# Patient Record
Sex: FEMALE | Race: BLACK OR AFRICAN AMERICAN | Employment: UNEMPLOYED | URBAN - METROPOLITAN AREA
[De-identification: names, ages, dates, MRNs, and addresses within clinical notes are randomized per-mention and may not be internally consistent; named-entity substitution may affect disease eponyms.]

---

## 2017-01-22 ENCOUNTER — HOSPITAL ENCOUNTER (OUTPATIENT)
Age: 27
Setting detail: OBSERVATION
Discharge: HOME | End: 2017-01-23
Attending: EMERGENCY MEDICINE | Admitting: OBSTETRICS & GYNECOLOGY
Payer: SELF-PAY

## 2017-01-22 ENCOUNTER — APPOINTMENT (OUTPATIENT)
Dept: ULTRASOUND IMAGING | Age: 27
End: 2017-01-22
Attending: EMERGENCY MEDICINE
Payer: SELF-PAY

## 2017-01-22 ENCOUNTER — ANESTHESIA (OUTPATIENT)
Dept: ULTRASOUND IMAGING | Age: 27
End: 2017-01-22
Payer: SELF-PAY

## 2017-01-22 DIAGNOSIS — O46.90 VAGINAL BLEEDING DURING PREGNANCY, ANTEPARTUM: Primary | ICD-10-CM

## 2017-01-22 DIAGNOSIS — O03.4 INCOMPLETE MISCARRIAGE: ICD-10-CM

## 2017-01-22 LAB
ANION GAP BLD CALC-SCNC: 11 MMOL/L (ref 3–18)
BASOPHILS # BLD AUTO: 0.1 K/UL (ref 0–0.06)
BASOPHILS # BLD: 1 % (ref 0–2)
BUN SERPL-MCNC: 8 MG/DL (ref 7–18)
BUN/CREAT SERPL: 11 (ref 12–20)
CALCIUM SERPL-MCNC: 9 MG/DL (ref 8.5–10.1)
CHLORIDE SERPL-SCNC: 101 MMOL/L (ref 100–108)
CO2 SERPL-SCNC: 26 MMOL/L (ref 21–32)
CREAT SERPL-MCNC: 0.71 MG/DL (ref 0.6–1.3)
DIFFERENTIAL METHOD BLD: ABNORMAL
EOSINOPHIL # BLD: 0.4 K/UL (ref 0–0.4)
EOSINOPHIL NFR BLD: 4 % (ref 0–5)
ERYTHROCYTE [DISTWIDTH] IN BLOOD BY AUTOMATED COUNT: 13 % (ref 11.6–14.5)
GLUCOSE SERPL-MCNC: 94 MG/DL (ref 74–99)
HCG SERPL-ACNC: 7196 MIU/ML (ref 1–6)
HCT VFR BLD AUTO: 34.4 % (ref 35–45)
HGB BLD-MCNC: 11.5 G/DL (ref 12–16)
LYMPHOCYTES # BLD AUTO: 46 % (ref 21–52)
LYMPHOCYTES # BLD: 4.2 K/UL (ref 0.9–3.6)
MCH RBC QN AUTO: 28.8 PG (ref 24–34)
MCHC RBC AUTO-ENTMCNC: 33.4 G/DL (ref 31–37)
MCV RBC AUTO: 86.2 FL (ref 74–97)
MONOCYTES # BLD: 0.5 K/UL (ref 0.05–1.2)
MONOCYTES NFR BLD AUTO: 6 % (ref 3–10)
NEUTS SEG # BLD: 3.9 K/UL (ref 1.8–8)
NEUTS SEG NFR BLD AUTO: 43 % (ref 40–73)
PLATELET # BLD AUTO: 270 K/UL (ref 135–420)
PMV BLD AUTO: 9.7 FL (ref 9.2–11.8)
POTASSIUM SERPL-SCNC: 3.7 MMOL/L (ref 3.5–5.5)
RBC # BLD AUTO: 3.99 M/UL (ref 4.2–5.3)
SODIUM SERPL-SCNC: 138 MMOL/L (ref 136–145)
WBC # BLD AUTO: 9.1 K/UL (ref 4.6–13.2)

## 2017-01-22 PROCEDURE — 86900 BLOOD TYPING SEROLOGIC ABO: CPT | Performed by: EMERGENCY MEDICINE

## 2017-01-22 PROCEDURE — 87491 CHLMYD TRACH DNA AMP PROBE: CPT | Performed by: EMERGENCY MEDICINE

## 2017-01-22 PROCEDURE — 85025 COMPLETE CBC W/AUTO DIFF WBC: CPT | Performed by: EMERGENCY MEDICINE

## 2017-01-22 PROCEDURE — 87210 SMEAR WET MOUNT SALINE/INK: CPT | Performed by: EMERGENCY MEDICINE

## 2017-01-22 PROCEDURE — 76817 TRANSVAGINAL US OBSTETRIC: CPT

## 2017-01-22 PROCEDURE — 74011250636 HC RX REV CODE- 250/636: Performed by: EMERGENCY MEDICINE

## 2017-01-22 PROCEDURE — 86920 COMPATIBILITY TEST SPIN: CPT | Performed by: EMERGENCY MEDICINE

## 2017-01-22 PROCEDURE — 84702 CHORIONIC GONADOTROPIN TEST: CPT | Performed by: EMERGENCY MEDICINE

## 2017-01-22 PROCEDURE — 80048 BASIC METABOLIC PNL TOTAL CA: CPT | Performed by: EMERGENCY MEDICINE

## 2017-01-22 RX ORDER — SODIUM CHLORIDE 9 MG/ML
1000 INJECTION, SOLUTION INTRAVENOUS ONCE
Status: COMPLETED | OUTPATIENT
Start: 2017-01-22 | End: 2017-01-23

## 2017-01-22 RX ADMIN — SODIUM CHLORIDE 1000 ML: 900 INJECTION, SOLUTION INTRAVENOUS at 23:03

## 2017-01-22 NOTE — IP AVS SNAPSHOT
[address] Patient: Armaan Love MRN: [id number] WYN:[birth year] You are allergic to the following No active allergies Recent Documentation Height Weight BMI OB Status Smoking Status 1.524 m 65.3 kg 28.12 kg/m2 Pregnant Never Smoker Unresulted Labs Order Current Status CHLAMYDIA/NEISSERIA AMPLIFICATION In process TYPE & CROSSMATCH Preliminary result Emergency Contacts Name Discharge Info Relation Home Work Mobile Elvia Silva  Other Relative [6] [phone] About your hospitalization You were admitted on:  January 23, 2017 You last received care in the:  [facility] [address] You were discharged on:  January 23, 2017 Unit phone number:  [phone] Why you were hospitalized Your primary diagnosis was:  Not on File Providers Seen During Your Hospitalizations Provider Role Specialty Primary office phone Danita Munguia MD Attending Provider Emergency Medicine [phone] Criss Mancia MD Attending Provider Obstetrics & Gynecology [phone] Your Primary Care Physician (PCP) Primary Care Physician Office Phone Office Fax NONE ** None ** ** None ** Follow-up Information Follow up With Details Comments Contact Info Criss Mancia MD In 2 weeks  [facility] [address] [place] [address] 
[phone] None   None (395) Patient stated that they have no PCP Current Discharge Medication List  
  
START taking these medications Dose & Instructions Dispensing Information Comments Morning Noon Evening Bedtime  
 iron-vitamin C 65 mg iron- 125 mg Tbec Commonly known as:  VITRON-C Your next dose is: Today, Tomorrow Other:  _________ Dose:  1 Tab Take 1 Tab by mouth daily. Quantity:  90 Tab Refills:  1 metroNIDAZOLE 500 mg tablet Commonly known as:  FLAGYL Your next dose is: Today, Tomorrow Other:  _________ Dose:  500 mg Take 1 Tab by mouth two (2) times a day for 5 days. Quantity:  9 Tab Refills:  0  
     
   
   
   
  
 oxyCODONE-acetaminophen 5-325 mg per tablet Commonly known as:  PERCOCET Your next dose is: Today, Tomorrow Other:  _________ Dose:  1-2 Tab Take 1-2 Tabs by mouth every six (6) hours as needed for Pain. Max Daily Amount: 8 Tabs. Quantity:  20 Tab Refills:  0 CONTINUE these medications which have NOT CHANGED Dose & Instructions Dispensing Information Comments Morning Noon Evening Bedtime  
 levothyroxine 125 mcg tablet Commonly known as:  SYNTHROID Your next dose is: Today, Tomorrow Other:  _________ Take  by mouth Daily (before breakfast). Refills:  0  
     
   
   
   
  
 lidocaine 2 % solution Commonly known as:  LIDOCAINE VISCOUS Your next dose is: Today, Tomorrow Other:  _________ Put 10 mL in mouth and swish and spit out QAC and at bedtime Quantity:  200 mL Refills:  0 STOP taking these medications NORCO 5-325 mg per tablet Generic drug:  HYDROcodone-acetaminophen Where to Get Your Medications These medications were sent to [address], [address] [place] [address] [place] [address] Phone:  [phone]  
  iron-vitamin C 65 mg iron- 125 mg Tbec  
 metroNIDAZOLE 500 mg tablet Information on where to get these meds will be given to you by the nurse or doctor. ! Ask your nurse or doctor about these medications  
  oxyCODONE-acetaminophen 5-325 mg per tablet Discharge Instructions None Discharge Instructions Attachments/References D AND C: POST-OP (ENGLISH) Discharge Orders None Can[employer] Announcement We are excited to announce that we are making your provider's discharge notes available to you in CanDiag. You will see these notes when they are completed and signed by the physician that discharged you from your recent hospital stay. If you have any questions or concerns about any information you see in CanDiag, please call the Health Information Department where you were seen or reach out to your Primary Care Provider for more information about your plan of care. Introducing [facility]! Andie Tee introduces CanDiag patient portal. Now you can access parts of your medical record, email your doctor's office, and request medication refills online. 1. In your internet browser, go to [email or web address] 2. Click on the First Time User? Click Here link in the Sign In box. You will see the New Member Sign Up page. 3. Enter your CanDiag Access Code exactly as it appears below. You will not need to use this code after youve completed the sign-up process. If you do not sign up before the expiration date, you must request a new code. · CanDiag Access Code: [email or web address] Expires: 4/22/2017 10:46 PM 
 
4. Enter the last four digits of your Social Security Number (xxxx) and Date of Birth (mm/dd/yyyy) as indicated and click Submit. You will be taken to the next sign-up page. 5. Create a CanDiag ID. This will be your CanDiag login ID and cannot be changed, so think of one that is secure and easy to remember. 6. Create a CanDiag password. You can change your password at any time. 7. Enter your Password Reset Question and Answer. This can be used at a later time if you forget your password. 8. Enter your e-mail address. You will receive e-mail notification when new information is available in [address] 9. Click Sign Up. You can now view and download portions of your medical record. 10. Click the Download Summary menu link to download a portable copy of your medical information. If you have questions, please visit the Frequently Asked Questions section of the [employer] website. Remember, [employer] is NOT to be used for urgent needs. For medical emergencies, dial 911. Now available from your iPhone and Android! General Information Please provide this summary of care documentation to your next provider. Patient Signature:  ____________________________________________________________ Date:  ____________________________________________________________  
  
Ag Ayoub Provider Signature:  ____________________________________________________________ Date:  ____________________________________________________________ More Information Dilation and Curettage: What to Expect at [facility] Your Recovery Dilation and curettage (D&C) is a procedure to remove tissue from the inside of the uterus. The doctor used a curved tool, called a curette, to gently scrape tissue from your uterus. You are likely to have a backache, or cramps similar to menstrual cramps, and pass small clots of blood from your vagina for the first few days. You may continue to have light vaginal bleeding for several weeks after the procedure. You will probably be able to go back to most of your normal activities in 1 or 2 days. This care sheet gives you a general idea about how long it will take for you to recover. But each person recovers at a different pace. Follow the steps below to get better as quickly as possible. How can you care for yourself at home? Activity · Rest when you feel tired. Getting enough sleep will help you recover. · Avoid strenuous activities, such as bicycle riding, jogging, weight lifting, or aerobic exercise, until your doctor says it is okay. · Most women are able to return to work the day after the procedure. · You may have some light vaginal bleeding. Wear sanitary pads if needed. Do not douche or use tampons for 2 weeks or until your doctor says it is okay. · Ask your doctor when it is okay for you to have sex. · If you could become pregnant, talk about birth control with your doctor. Do not try to become pregnant until your doctor says it is okay. Diet · You can eat your normal diet. If your stomach is upset, try bland, low-fat foods like plain rice, broiled chicken, toast, and yogurt. · Drink plenty of fluids (unless your doctor tells you not to). Medicines · Your doctor will tell you if and when you can restart your medicines. He or she will also give you instructions about taking any new medicines. · If you take blood thinners, such as warfarin (Coumadin), clopidogrel (Plavix), or aspirin, be sure to talk to your doctor. He or she will tell you if and when to start taking those medicines again. Make sure that you understand exactly what your doctor wants you to do. · Be safe with medicines. Take pain medicines exactly as directed. ¨ If the doctor gave you a prescription medicine for pain, take it as prescribed. ¨ If you are not taking a prescription pain medicine, ask your doctor if you can take an over-the-counter medicine. · If you think your pain medicine is making you sick to your stomach: 
¨ Take your medicine after meals (unless your doctor has told you not to). ¨ Ask your doctor for a different pain medicine. · If your doctor prescribed antibiotics, take them as directed. Do not stop taking them just because you feel better. You need to take the full course of antibiotics. Follow-up care is a key part of your treatment and safety. Be sure to make and go to all appointments, and call your doctor if you are having problems. It's also a good idea to know your test results and keep a list of the medicines you take. When should you call for help? Call 911 anytime you think you may need emergency care. For example, call if: 
· You passed out (lost consciousness). · You have severe trouble breathing. · You have chest pain and shortness of breath, or you cough up blood. · You have severe pain in your belly. Call your doctor now or seek immediate medical care if: 
· You have bright red vaginal bleeding that soaks one or more pads each hour for 2 or more hours. · You pass blood clots that are larger than a golf ball. · You have vaginal discharge that smells bad. · You are sick to your stomach or cannot keep fluids down. · You have pain that does not get better after you take pain medicine. · You have pain that is getting worse 2 days after the procedure. · You have a fever over 100°F. 
· Your belly feels tender, or full and hard. Watch closely for changes in your health, and be sure to contact your doctor if: 
· You do not get better as expected. Where can you learn more? Go to http://jeancarlos-meera.info/. Enter [phone] in the search box to learn more about \"Dilation and Curettage: What to Expect at Home. \" Current as of: May 30, 2016 Content Version: 11.1 © 4624-2032 [employer]. Care instructions adapted under license by [employer] (which disclaims liability or warranty for this information). If you have questions about a medical condition or this instruction, always ask your healthcare professional. [place] [address] any warranty or liability for your use of this information.

## 2017-01-22 NOTE — IP AVS SNAPSHOT
Current Discharge Medication List  
  
Take these medications at their scheduled times Dose & Instructions Dispensing Information Comments Morning Noon Evening Bedtime  
 iron-vitamin C 65 mg iron- 125 mg Tbec Commonly known as:  VITRON-C Your next dose is: Today, Tomorrow Other:  ____________ Dose:  1 Tab Take 1 Tab by mouth daily. Quantity:  90 Tab Refills:  1  
     
   
   
   
  
 levothyroxine 125 mcg tablet Commonly known as:  SYNTHROID Your next dose is: Today, Tomorrow Other:  ____________ Take  by mouth Daily (before breakfast). Refills:  0  
     
   
   
   
  
 metroNIDAZOLE 500 mg tablet Commonly known as:  FLAGYL Your next dose is: Today, Tomorrow Other:  ____________ Dose:  500 mg Take 1 Tab by mouth two (2) times a day for 5 days. Quantity:  9 Tab Refills:  0 Take these medications as needed Dose & Instructions Dispensing Information Comments Morning Noon Evening Bedtime  
 oxyCODONE-acetaminophen 5-325 mg per tablet Commonly known as:  PERCOCET Your next dose is: Today, Tomorrow Other:  ____________ Dose:  1-2 Tab Take 1-2 Tabs by mouth every six (6) hours as needed for Pain. Max Daily Amount: 8 Tabs. Quantity:  20 Tab Refills:  0 Take these medications as directed Dose & Instructions Dispensing Information Comments Morning Noon Evening Bedtime  
 lidocaine 2 % solution Commonly known as:  LIDOCAINE VISCOUS Your next dose is: Today, Tomorrow Other:  ____________ Put 10 mL in mouth and swish and spit out QAC and at bedtime Quantity:  200 mL Refills:  0 Where to Get Your Medications These medications were sent to [address], [address] [facility]  [address] [place] [address], [address] [facility] [address]  [address] Phone:  [phone]  
  iron-vitamin C 65 mg iron- 125 mg Tbec  
 metroNIDAZOLE 500 mg tablet Information about where to get these medications is not yet available ! Ask your nurse or doctor about these medications  
  oxyCODONE-acetaminophen 5-325 mg per tablet

## 2017-01-22 NOTE — IP AVS SNAPSHOT
Summary of Care Report The Summary of Care report has been created to help improve care coordination. Users with access to [employer] or [address] (Web-based application) may access additional patient information including the Discharge Summary. If you are not currently a [address] user and need more information, please call the number listed below in the [place] [address] section and ask to be connected with Medical Records. Facility Information Name Address Phone [address] [facility]  [address] [facility] [phone] Patient Information Patient Name Sex  Rivas Fletcher ([id number]) Female 1990 Discharge Information Admitting Provider Service Area Unit Roseann Bo MD / [address] Mother Baby Unit / [phone] Discharge Provider Discharge Date/Time Discharge Disposition Destination (none) 2017 Afternoon (Pending) AHR (none) Patient Language Language ENGLISH [13] You are allergic to the following No active allergies Current Discharge Medication List  
  
START taking these medications Dose & Instructions Dispensing Information Comments  
 iron-vitamin C 65 mg iron- 125 mg Tbec Commonly known as:  VITRON-C Dose:  1 Tab Take 1 Tab by mouth daily. Quantity:  90 Tab Refills:  1  
   
 metroNIDAZOLE 500 mg tablet Commonly known as:  FLAGYL Dose:  500 mg Take 1 Tab by mouth two (2) times a day for 5 days. Quantity:  9 Tab Refills:  0  
   
 oxyCODONE-acetaminophen 5-325 mg per tablet Commonly known as:  PERCOCET Dose:  1-2 Tab Take 1-2 Tabs by mouth every six (6) hours as needed for Pain. Max Daily Amount: 8 Tabs. Quantity:  20 Tab Refills:  0 CONTINUE these medications which have NOT CHANGED Dose & Instructions Dispensing Information Comments  
 levothyroxine 125 mcg tablet Commonly known as:  SYNTHROID Take  by mouth Daily (before breakfast). Refills:  0  
   
 lidocaine 2 % solution Commonly known as:  LIDOCAINE VISCOUS Put 10 mL in mouth and swish and spit out QAC and at bedtime Quantity:  200 mL Refills:  0 STOP taking these medications Comments NORCO 5-325 mg per tablet Generic drug:  HYDROcodone-acetaminophen Surgery Information ID Date/Time Status Primary Surgeon All Procedures Location [id number] 1/22/2017 Unpaiyana SALAZAR [facility] - DO NOT SCHEDULE    
 [id number] 1/23/2017 0456 Lauri Irvin MD DILATATION AND CURETTAGE [facility] MAIN OR Follow-up Information Follow up With Details Comments Contact Info Chavo Irvin MD In 2 weeks  [address] [place] [address] 
[phone] None   None (395) Patient stated that they have no PCP Discharge Instructions None Chart Review Routing History No Routing History on File

## 2017-01-23 ENCOUNTER — ANESTHESIA EVENT (OUTPATIENT)
Dept: SURGERY | Age: 27
End: 2017-01-23
Payer: SELF-PAY

## 2017-01-23 ENCOUNTER — ANESTHESIA EVENT (OUTPATIENT)
Dept: ULTRASOUND IMAGING | Age: 27
End: 2017-01-23
Payer: SELF-PAY

## 2017-01-23 ENCOUNTER — ANESTHESIA (OUTPATIENT)
Dept: SURGERY | Age: 27
End: 2017-01-23
Payer: SELF-PAY

## 2017-01-23 VITALS
BODY MASS INDEX: 28.27 KG/M2 | HEART RATE: 75 BPM | HEIGHT: 60 IN | RESPIRATION RATE: 16 BRPM | SYSTOLIC BLOOD PRESSURE: 104 MMHG | WEIGHT: 144 LBS | TEMPERATURE: 98.5 F | DIASTOLIC BLOOD PRESSURE: 64 MMHG | OXYGEN SATURATION: 99 %

## 2017-01-23 LAB
ABO + RH BLD: NORMAL
BASOPHILS # BLD AUTO: 0 K/UL (ref 0–0.06)
BASOPHILS # BLD: 0 % (ref 0–2)
DIFFERENTIAL METHOD BLD: ABNORMAL
EOSINOPHIL # BLD: 0.3 K/UL (ref 0–0.4)
EOSINOPHIL NFR BLD: 4 % (ref 0–5)
ERYTHROCYTE [DISTWIDTH] IN BLOOD BY AUTOMATED COUNT: 12.8 % (ref 11.6–14.5)
ERYTHROCYTE [DISTWIDTH] IN BLOOD BY AUTOMATED COUNT: 13.7 % (ref 11.6–14.5)
HCT VFR BLD AUTO: 22.4 % (ref 35–45)
HCT VFR BLD AUTO: 26.9 % (ref 35–45)
HGB BLD-MCNC: 7.4 G/DL (ref 12–16)
HGB BLD-MCNC: 9 G/DL (ref 12–16)
LYMPHOCYTES # BLD AUTO: 50 % (ref 21–52)
LYMPHOCYTES # BLD: 3.4 K/UL (ref 0.9–3.6)
MCH RBC QN AUTO: 29.2 PG (ref 24–34)
MCH RBC QN AUTO: 29.4 PG (ref 24–34)
MCHC RBC AUTO-ENTMCNC: 33 G/DL (ref 31–37)
MCHC RBC AUTO-ENTMCNC: 33.5 G/DL (ref 31–37)
MCV RBC AUTO: 87.9 FL (ref 74–97)
MCV RBC AUTO: 88.5 FL (ref 74–97)
MONOCYTES # BLD: 0.3 K/UL (ref 0.05–1.2)
MONOCYTES NFR BLD AUTO: 5 % (ref 3–10)
NEUTS SEG # BLD: 2.8 K/UL (ref 1.8–8)
NEUTS SEG NFR BLD AUTO: 41 % (ref 40–73)
PLATELET # BLD AUTO: 149 K/UL (ref 135–420)
PLATELET # BLD AUTO: 199 K/UL (ref 135–420)
PLATELET COMMENTS,PCOM: ABNORMAL
PMV BLD AUTO: 9.2 FL (ref 9.2–11.8)
PMV BLD AUTO: 9.5 FL (ref 9.2–11.8)
RBC # BLD AUTO: 2.53 M/UL (ref 4.2–5.3)
RBC # BLD AUTO: 3.06 M/UL (ref 4.2–5.3)
RBC MORPH BLD: ABNORMAL
SERVICE CMNT-IMP: NORMAL
WBC # BLD AUTO: 6.8 K/UL (ref 4.6–13.2)
WBC # BLD AUTO: 9.4 K/UL (ref 4.6–13.2)
WET PREP GENITAL: NORMAL

## 2017-01-23 PROCEDURE — 99218 HC RM OBSERVATION: CPT

## 2017-01-23 PROCEDURE — 76210000006 HC OR PH I REC 0.5 TO 1 HR: Performed by: OBSTETRICS & GYNECOLOGY

## 2017-01-23 PROCEDURE — 74011250637 HC RX REV CODE- 250/637: Performed by: EMERGENCY MEDICINE

## 2017-01-23 PROCEDURE — 99285 EMERGENCY DEPT VISIT HI MDM: CPT

## 2017-01-23 PROCEDURE — 36415 COLL VENOUS BLD VENIPUNCTURE: CPT | Performed by: OBSTETRICS & GYNECOLOGY

## 2017-01-23 PROCEDURE — 74011250636 HC RX REV CODE- 250/636

## 2017-01-23 PROCEDURE — 74011000250 HC RX REV CODE- 250

## 2017-01-23 PROCEDURE — 96361 HYDRATE IV INFUSION ADD-ON: CPT

## 2017-01-23 PROCEDURE — 74011250636 HC RX REV CODE- 250/636: Performed by: EMERGENCY MEDICINE

## 2017-01-23 PROCEDURE — 85025 COMPLETE CBC W/AUTO DIFF WBC: CPT | Performed by: EMERGENCY MEDICINE

## 2017-01-23 PROCEDURE — 74011000250 HC RX REV CODE- 250: Performed by: NURSE ANESTHETIST, CERTIFIED REGISTERED

## 2017-01-23 PROCEDURE — 77030020782 HC GWN BAIR PAWS FLX 3M -B: Performed by: OBSTETRICS & GYNECOLOGY

## 2017-01-23 PROCEDURE — 74011000250 HC RX REV CODE- 250: Performed by: OBSTETRICS & GYNECOLOGY

## 2017-01-23 PROCEDURE — 96360 HYDRATION IV INFUSION INIT: CPT

## 2017-01-23 PROCEDURE — 88305 TISSUE EXAM BY PATHOLOGIST: CPT | Performed by: OBSTETRICS & GYNECOLOGY

## 2017-01-23 PROCEDURE — 85027 COMPLETE CBC AUTOMATED: CPT | Performed by: OBSTETRICS & GYNECOLOGY

## 2017-01-23 PROCEDURE — 76060000032 HC ANESTHESIA 0.5 TO 1 HR: Performed by: OBSTETRICS & GYNECOLOGY

## 2017-01-23 PROCEDURE — 77030009368: Performed by: OBSTETRICS & GYNECOLOGY

## 2017-01-23 PROCEDURE — 77030027138 HC INCENT SPIROMETER -A

## 2017-01-23 PROCEDURE — P9016 RBC LEUKOCYTES REDUCED: HCPCS | Performed by: EMERGENCY MEDICINE

## 2017-01-23 PROCEDURE — 76010000138 HC OR TIME 0.5 TO 1 HR: Performed by: OBSTETRICS & GYNECOLOGY

## 2017-01-23 RX ORDER — ACETAMINOPHEN 325 MG/1
650 TABLET ORAL
Status: COMPLETED | OUTPATIENT
Start: 2017-01-23 | End: 2017-01-23

## 2017-01-23 RX ORDER — HYDROCODONE BITARTRATE AND ACETAMINOPHEN 5; 325 MG/1; MG/1
2 TABLET ORAL
Status: DISCONTINUED | OUTPATIENT
Start: 2017-01-23 | End: 2017-01-23 | Stop reason: HOSPADM

## 2017-01-23 RX ORDER — NALOXONE HYDROCHLORIDE 0.4 MG/ML
0.4 INJECTION, SOLUTION INTRAMUSCULAR; INTRAVENOUS; SUBCUTANEOUS AS NEEDED
Status: DISCONTINUED | OUTPATIENT
Start: 2017-01-23 | End: 2017-01-23 | Stop reason: HOSPADM

## 2017-01-23 RX ORDER — EPHEDRINE SULFATE/0.9% NACL/PF 25 MG/5 ML
SYRINGE (ML) INTRAVENOUS AS NEEDED
Status: DISCONTINUED | OUTPATIENT
Start: 2017-01-23 | End: 2017-01-23 | Stop reason: HOSPADM

## 2017-01-23 RX ORDER — FENTANYL CITRATE 50 UG/ML
50 INJECTION, SOLUTION INTRAMUSCULAR; INTRAVENOUS
Status: DISCONTINUED | OUTPATIENT
Start: 2017-01-23 | End: 2017-01-23 | Stop reason: HOSPADM

## 2017-01-23 RX ORDER — METRONIDAZOLE 500 MG/1
500 TABLET ORAL 2 TIMES DAILY
Qty: 9 TAB | Refills: 0 | Status: SHIPPED | OUTPATIENT
Start: 2017-01-23 | End: 2017-01-28

## 2017-01-23 RX ORDER — ONDANSETRON 2 MG/ML
4 INJECTION INTRAMUSCULAR; INTRAVENOUS ONCE
Status: DISCONTINUED | OUTPATIENT
Start: 2017-01-23 | End: 2017-01-23 | Stop reason: HOSPADM

## 2017-01-23 RX ORDER — HYDROCODONE BITARTRATE AND ACETAMINOPHEN 5; 325 MG/1; MG/1
1 TABLET ORAL
Status: DISCONTINUED | OUTPATIENT
Start: 2017-01-23 | End: 2017-01-23 | Stop reason: HOSPADM

## 2017-01-23 RX ORDER — OXYCODONE AND ACETAMINOPHEN 5; 325 MG/1; MG/1
1-2 TABLET ORAL
Qty: 20 TAB | Refills: 0 | Status: SHIPPED | OUTPATIENT
Start: 2017-01-23

## 2017-01-23 RX ORDER — SODIUM CHLORIDE, SODIUM LACTATE, POTASSIUM CHLORIDE, CALCIUM CHLORIDE 600; 310; 30; 20 MG/100ML; MG/100ML; MG/100ML; MG/100ML
INJECTION, SOLUTION INTRAVENOUS
Status: DISCONTINUED | OUTPATIENT
Start: 2017-01-23 | End: 2017-01-23 | Stop reason: HOSPADM

## 2017-01-23 RX ORDER — SODIUM CHLORIDE 9 MG/ML
250 INJECTION, SOLUTION INTRAVENOUS AS NEEDED
Status: DISCONTINUED | OUTPATIENT
Start: 2017-01-23 | End: 2017-01-23 | Stop reason: HOSPADM

## 2017-01-23 RX ORDER — ONDANSETRON 2 MG/ML
4 INJECTION INTRAMUSCULAR; INTRAVENOUS
Status: DISCONTINUED | OUTPATIENT
Start: 2017-01-23 | End: 2017-01-23 | Stop reason: HOSPADM

## 2017-01-23 RX ORDER — FENTANYL CITRATE 50 UG/ML
INJECTION, SOLUTION INTRAMUSCULAR; INTRAVENOUS AS NEEDED
Status: DISCONTINUED | OUTPATIENT
Start: 2017-01-23 | End: 2017-01-23 | Stop reason: HOSPADM

## 2017-01-23 RX ORDER — METRONIDAZOLE 500 MG/100ML
500 INJECTION, SOLUTION INTRAVENOUS ONCE
Status: COMPLETED | OUTPATIENT
Start: 2017-01-23 | End: 2017-01-23

## 2017-01-23 RX ORDER — ONDANSETRON 2 MG/ML
INJECTION INTRAMUSCULAR; INTRAVENOUS AS NEEDED
Status: DISCONTINUED | OUTPATIENT
Start: 2017-01-23 | End: 2017-01-23

## 2017-01-23 RX ORDER — SODIUM CHLORIDE 0.9 % (FLUSH) 0.9 %
5-10 SYRINGE (ML) INJECTION AS NEEDED
Status: DISCONTINUED | OUTPATIENT
Start: 2017-01-23 | End: 2017-01-23 | Stop reason: HOSPADM

## 2017-01-23 RX ORDER — KETOROLAC TROMETHAMINE 30 MG/ML
30 INJECTION, SOLUTION INTRAMUSCULAR; INTRAVENOUS
Status: DISCONTINUED | OUTPATIENT
Start: 2017-01-23 | End: 2017-01-23 | Stop reason: HOSPADM

## 2017-01-23 RX ORDER — LIDOCAINE HYDROCHLORIDE 20 MG/ML
INJECTION, SOLUTION EPIDURAL; INFILTRATION; INTRACAUDAL; PERINEURAL AS NEEDED
Status: DISCONTINUED | OUTPATIENT
Start: 2017-01-23 | End: 2017-01-23 | Stop reason: HOSPADM

## 2017-01-23 RX ORDER — SODIUM CHLORIDE 9 MG/ML
1000 INJECTION, SOLUTION INTRAVENOUS ONCE
Status: COMPLETED | OUTPATIENT
Start: 2017-01-23 | End: 2017-01-23

## 2017-01-23 RX ORDER — SODIUM CHLORIDE 9 MG/ML
INJECTION, SOLUTION INTRAVENOUS
Status: DISCONTINUED | OUTPATIENT
Start: 2017-01-23 | End: 2017-01-23 | Stop reason: HOSPADM

## 2017-01-23 RX ORDER — PROPOFOL 10 MG/ML
INJECTION, EMULSION INTRAVENOUS AS NEEDED
Status: DISCONTINUED | OUTPATIENT
Start: 2017-01-23 | End: 2017-01-23 | Stop reason: HOSPADM

## 2017-01-23 RX ORDER — SILVER NITRATE 38.21; 12.74 MG/1; MG/1
STICK TOPICAL AS NEEDED
Status: DISCONTINUED | OUTPATIENT
Start: 2017-01-23 | End: 2017-01-23 | Stop reason: HOSPADM

## 2017-01-23 RX ORDER — FAMOTIDINE 10 MG/ML
20 INJECTION INTRAVENOUS ONCE
Status: DISCONTINUED | OUTPATIENT
Start: 2017-01-23 | End: 2017-01-23 | Stop reason: HOSPADM

## 2017-01-23 RX ORDER — SODIUM CHLORIDE 0.9 % (FLUSH) 0.9 %
5-10 SYRINGE (ML) INJECTION EVERY 8 HOURS
Status: DISCONTINUED | OUTPATIENT
Start: 2017-01-23 | End: 2017-01-23 | Stop reason: HOSPADM

## 2017-01-23 RX ORDER — MIDAZOLAM HYDROCHLORIDE 1 MG/ML
INJECTION, SOLUTION INTRAMUSCULAR; INTRAVENOUS AS NEEDED
Status: DISCONTINUED | OUTPATIENT
Start: 2017-01-23 | End: 2017-01-23 | Stop reason: HOSPADM

## 2017-01-23 RX ORDER — DIPHENHYDRAMINE HCL 25 MG
25 CAPSULE ORAL
Status: DISCONTINUED | OUTPATIENT
Start: 2017-01-23 | End: 2017-01-23 | Stop reason: HOSPADM

## 2017-01-23 RX ADMIN — SODIUM CHLORIDE 1000 ML: 900 INJECTION, SOLUTION INTRAVENOUS at 00:34

## 2017-01-23 RX ADMIN — SODIUM CHLORIDE 1000 ML: 900 INJECTION, SOLUTION INTRAVENOUS at 02:21

## 2017-01-23 RX ADMIN — Medication 5 MG: at 05:09

## 2017-01-23 RX ADMIN — LIDOCAINE HYDROCHLORIDE 60 MG: 20 INJECTION, SOLUTION EPIDURAL; INFILTRATION; INTRACAUDAL; PERINEURAL at 05:08

## 2017-01-23 RX ADMIN — SODIUM CHLORIDE: 9 INJECTION, SOLUTION INTRAVENOUS at 05:10

## 2017-01-23 RX ADMIN — SODIUM CHLORIDE, SODIUM LACTATE, POTASSIUM CHLORIDE, CALCIUM CHLORIDE: 600; 310; 30; 20 INJECTION, SOLUTION INTRAVENOUS at 05:03

## 2017-01-23 RX ADMIN — PROPOFOL 150 MG: 10 INJECTION, EMULSION INTRAVENOUS at 05:09

## 2017-01-23 RX ADMIN — MIDAZOLAM HYDROCHLORIDE 2 MG: 1 INJECTION, SOLUTION INTRAMUSCULAR; INTRAVENOUS at 05:03

## 2017-01-23 RX ADMIN — METRONIDAZOLE 500 MG: 500 SOLUTION INTRAVENOUS at 05:15

## 2017-01-23 RX ADMIN — ONDANSETRON 4 MG: 2 INJECTION INTRAMUSCULAR; INTRAVENOUS at 05:21

## 2017-01-23 RX ADMIN — ACETAMINOPHEN 650 MG: 325 TABLET ORAL at 00:50

## 2017-01-23 RX ADMIN — FENTANYL CITRATE 50 MCG: 50 INJECTION, SOLUTION INTRAMUSCULAR; INTRAVENOUS at 05:08

## 2017-01-23 NOTE — ANESTHESIA PREPROCEDURE EVALUATION
Anesthetic History   No history of anesthetic complications            Review of Systems / Medical History  Patient summary reviewed, nursing notes reviewed and pertinent labs reviewed    Pulmonary  Within defined limits                 Neuro/Psych   Within defined limits           Cardiovascular    Hypertension                   GI/Hepatic/Renal  Within defined limits              Endo/Other      Hypothyroidism       Other Findings   Comments: Current Smoker? NO       Elective Surgery? Yes       Abstained from smoking 24 hours prior to anesthesia? N/A    Risk Factors for Postoperative nausea/vomiting:       History of postoperative nausea/vomiting? NO       Female? YES       Motion sickness? NO       Intended opioid administration for postoperative analgesia?   NO         Physical Exam    Airway  Mallampati: I  TM Distance: 4 - 6 cm  Neck ROM: normal range of motion   Mouth opening: Normal     Cardiovascular    Rhythm: regular  Rate: normal         Dental  No notable dental hx       Pulmonary  Breath sounds clear to auscultation               Abdominal  GI exam deferred       Other Findings            Anesthetic Plan    ASA: 2, emergent  Anesthesia type: general          Induction: Intravenous  Anesthetic plan and risks discussed with: Patient

## 2017-01-23 NOTE — ROUTINE PROCESS
Bedside and Verbal shift change report given to EVELYN Moralez (oncoming nurse) by Misty Rees. Amos Olivo, RN (offgoing nurse). Report included the following information Kardex, OR Summary, Intake/Output, MAR and Recent Results. Assumed care of pt resting in bed room 2208. Awake and alert. VSS. No c/o pain at this time. Admission assessment  Completed. And within normal limits. . [facility]. No s/s of infiltration. Saline lock noted in left arm. Site intact. Clear liquid breakfast taken and tolerated well. No nausea or vomiting    0850. Assisted up to bathroom. Voided 500 mls. of blood tinged UA.,  Visitors present. 1100 oob voided 300 mls blood tinged UA    1145 IV in right AC  Flushed and converted to saline lock. Tolerated a regular diet. No nausea or vomiting. .  2922 Lab here to draw CBC.    1404. Saline lock  In right AC bleeding around insertion site. . Saline lock removed. Dr. Estes notified of CBC results. Discharge orders received. 1545. Discharged home in stable condition after discharge instructions and prescription given. Verbalized understanding.

## 2017-01-23 NOTE — DISCHARGE SUMMARY
Gynecology Surgical Discharge Summary     Name: Ally Abrams MRN: [id number]  SSN: [id number]    YOB: 1990  Age: 32 y.o. Sex: female      Admit date: 1/22/2017    Discharge Date: 1/23/2017      Attending Physician: Chavo Irvin MD     Admission Diagnoses: Acute Blood Loss Anemia and Incomplete Ab    Discharge Diagnoses: Active Problems:  Acute Blood Loss Anemia and Incomplete Ab       Procedures: [facility]     Hospital Course: Normal hospital course for this procedure. Pt noted to have a significant drop in Hgb with ongoing moderate bleeding and was given 2 units of PRBCs. Significant Diagnostic Studies:   Recent Results (from the past 24 hour(s))   CBC WITH AUTOMATED DIFF    Collection Time: 01/22/17 10:50 PM   Result Value Ref Range    WBC 9.1 4.6 - 13.2 K/uL    RBC 3.99 (L) 4.20 - 5.30 M/uL    HGB 11.5 (L) 12.0 - 16.0 g/dL    HCT 34.4 (L) 35.0 - 45.0 %    MCV 86.2 74.0 - 97.0 FL    MCH 28.8 24.0 - 34.0 PG    MCHC 33.4 31.0 - 37.0 g/dL    RDW 13.0 11.6 - 14.5 %    PLATELET 322 723 - 340 K/uL    MPV 9.7 9.2 - 11.8 FL    NEUTROPHILS 43 40 - 73 %    LYMPHOCYTES 46 21 - 52 %    MONOCYTES 6 3 - 10 %    EOSINOPHILS 4 0 - 5 %    BASOPHILS 1 0 - 2 %    ABS. NEUTROPHILS 3.9 1.8 - 8.0 K/UL    ABS. LYMPHOCYTES 4.2 (H) 0.9 - 3.6 K/UL    ABS. MONOCYTES 0.5 0.05 - 1.2 K/UL    ABS. EOSINOPHILS 0.4 0.0 - 0.4 K/UL    ABS.  BASOPHILS 0.1 (H) 0.0 - 0.06 K/UL    DF AUTOMATED     METABOLIC PANEL, BASIC    Collection Time: 01/22/17 10:50 PM   Result Value Ref Range    Sodium 138 136 - 145 mmol/L    Potassium 3.7 3.5 - 5.5 mmol/L    Chloride 101 100 - 108 mmol/L    CO2 26 21 - 32 mmol/L    Anion gap 11 3.0 - 18 mmol/L    Glucose 94 74 - 99 mg/dL    BUN 8 7.0 - 18 MG/DL    Creatinine 0.71 0.6 - 1.3 MG/DL    BUN/Creatinine ratio 11 (L) 12 - 20      GFR est AA >60 >60 ml/min/1.73m2    GFR est non-AA >60 >60 ml/min/1.73m2    Calcium 9.0 8.5 - 10.1 MG/DL   TYPE, ABO & RH    Collection Time: 01/22/17 10:50 PM Result Value Ref Range    ABO/Rh(D) O POSITIVE    TOTAL HCG, QT. Collection Time: 01/22/17 10:50 PM   Result Value Ref Range    HCG, Qt. 7196 (H) 1.0 - 6.0 MIU/ML   TYPE & CROSSMATCH    Collection Time: 01/22/17 10:50 PM   Result Value Ref Range    Crossmatch Expiration 01/25/2017     ABO/Rh(D) O POSITIVE     Antibody screen NEG     Unit number [id number]     Blood component type RC LR CPDA     Unit division 00     Status of unit ISSUED     Crossmatch result Compatible     Unit number [id number]     Blood component type RC LR CPDA     Unit division 00     Status of unit ISSUED     Crossmatch result Compatible     Unit number [id number]     Blood component type RC LR CPDA     Unit division 00     Status of unit ISSUED     Crossmatch result Compatible     Unit number [id number]     Blood component type RC LR AS3,1     Unit division 00     Status of unit ALLOCATED     Crossmatch result Compatible     Unit number [id number]     Blood component type RC LR AS3,2     Unit division 00     Status of unit ALLOCATED     Crossmatch result Compatible    CBC WITH AUTOMATED DIFF    Collection Time: 01/23/17  2:10 AM   Result Value Ref Range    WBC 6.8 4.6 - 13.2 K/uL    RBC 2.53 (L) 4.20 - 5.30 M/uL    HGB 7.4 (L) 12.0 - 16.0 g/dL    HCT 22.4 (L) 35.0 - 45.0 %    MCV 88.5 74.0 - 97.0 FL    MCH 29.2 24.0 - 34.0 PG    MCHC 33.0 31.0 - 37.0 g/dL    RDW 12.8 11.6 - 14.5 %    PLATELET 675 036 - 621 K/uL    MPV 9.2 9.2 - 11.8 FL    NEUTROPHILS 41 40 - 73 %    LYMPHOCYTES 50 21 - 52 %    MONOCYTES 5 3 - 10 %    EOSINOPHILS 4 0 - 5 %    BASOPHILS 0 0 - 2 %    ABS. NEUTROPHILS 2.8 1.8 - 8.0 K/UL    ABS. LYMPHOCYTES 3.4 0.9 - 3.6 K/UL    ABS. MONOCYTES 0.3 0.05 - 1.2 K/UL    ABS. EOSINOPHILS 0.3 0.0 - 0.4 K/UL    ABS.  BASOPHILS 0.0 0.0 - 0.06 K/UL    DF AUTOMATED      PLATELET COMMENTS ADEQUATE PLATELETS      RBC COMMENTS NORMOCYTIC, NORMOCHROMIC         Patient Instructions:   Current Discharge Medication List START taking these medications    Details   oxyCODONE-acetaminophen (PERCOCET) 5-325 mg per tablet Take 1-2 Tabs by mouth every six (6) hours as needed for Pain. Max Daily Amount: 8 Tabs. Qty: 20 Tab, Refills: 0   Flagyl 500 mg BID x 5 days      CONTINUE these medications which have NOT CHANGED    Details   levothyroxine (SYNTHROID) 125 mcg tablet Take  by mouth Daily (before breakfast). lidocaine (LIDOCAINE VISCOUS) 2 % solution Put 10 mL in mouth and swish and spit out QAC and at bedtime  Qty: 200 mL, Refills: 0         STOP taking these medications       hydrocodone-acetaminophen (NORCO) 5-325 mg per tablet Comments:   Reason for Stopping:              Activity: No sex, douching, or tampons for 2 weeks or as directed by your physician. No heavy lifting for 2 weeks. No driving while taking pain medication. Diet: Resume pre-hospital diet      Follow-up with German Martinez in 2 weeks.         Signed By:  Carroll Alex MD     January 23, 2017 5:43 AM

## 2017-01-23 NOTE — H&P
Name: Shell Guerra MRN: [id number]    YOB: 1990  Age: 32 y.o. Sex: female        Chief Complaint   Patient presents with    Pregnancy Problem       HPI   1. Incomplete Ab--> Notes that she started having cramping with spotting this am, mild spotting during the day. Associated with cramping, went to eat approx 1830, started having more intense cramping that got worse. Noted moderate amount of bleeding, went to he hospital. [place] like her \"water broke, \" passed fluid and then started bleeding heavily, passed something that looked like a fetus. Bleeding has not let up very much, mildly better than before    OB History      Para Term  AB TAB SAB Ectopic Multiple Living    3 1 1  1 1    1        Obstetric Comments    LMP 10/28/2016 Patient is pregnant. Periods regular, last 5 days, flow heavy to light, mild dysmenorrhea  History of sexually transmitted infections chlamydia             PGyn  History   Sexual Activity    Sexual activity: Yes    Partners: Male     Comment: planned pregnancy         Past Medical History   Diagnosis Date    Hypothyroid        Past Surgical History   Procedure Laterality Date    Hx thyroidectomy         No Known Allergies    No current facility-administered medications on file prior to encounter. Current Outpatient Prescriptions on File Prior to Encounter   Medication Sig Dispense Refill    levothyroxine (SYNTHROID) 125 mcg tablet Take  by mouth Daily (before breakfast).  hydrocodone-acetaminophen (NORCO) 5-325 mg per tablet Take 1 tablet by mouth.  lidocaine (LIDOCAINE VISCOUS) 2 % solution Put 10 mL in mouth and swish and spit out QAC and at bedtime 200 mL 0       Social History     Social History    Marital status: SINGLE     Spouse name: N/A    Number of children: N/A    Years of education: N/A     Occupational History    Not on file.      Social History Main Topics    Smoking status: Never Smoker    Smokeless tobacco: Not on file    Alcohol use No    Drug use: No    Sexual activity: Yes     Partners: Male      Comment: planned pregnancy     Other Topics Concern    Not on file     Social History Narrative       Family History   Problem Relation Age of Onset    Thyroid Disease Maternal Grandmother        ROS   SOB and chest pain in the ED, some dizziness, nausea with dizziness, otherwise as per HPI        Visit Vitals    BP 90/46 (BP 1 Location: Right arm, BP Patient Position: At rest)    Pulse 79    Temp 98.1 °F (36.7 °C)    Resp 15    Ht 5' (1.524 m)    Wt 65.3 kg (144 lb)    SpO2 97%    BMI 28.12 kg/m2       GENERAL:  Well developed, well nourished, in no distress  NEURO/PSYCHE: Grossly intact, normal mood and affect  HEENT: Normal cephalic, atraumatic, good dentition, neck supple.  No thyromegaly  CV: regular rate and rhythm  LUNGS: clear to auscultation bilaterally, no wheezes, rhonchi or rales, good air entry with normal effort  ABDOMEN: + BS, soft without tenderness, no guarding, rebound or masses  EXTREMITIES: no edema or erythema noted  SKIN:  Warm, dry, no lesions  LYMPHATICS: No supraclavicular or inguinal nodes noted    PELVIC EXAM:  LABIA MAJORA: no masses, tenderness or lesions  LABIA MINORA: no masses, tenderness or lesions  CLITORIS: no masses, tenderness or lesions  URETHRA: normal appearing, no masses or tenderness  BLADDER: no fullness or tenderness  VAGINA: Introitus fills with BRB with placement of speculum, unable to see anything   PERINEUM: Blood noted        Recent Results (from the past 24 hour(s))   CBC WITH AUTOMATED DIFF    Collection Time: 01/22/17 10:50 PM   Result Value Ref Range    WBC 9.1 4.6 - 13.2 K/uL    RBC 3.99 (L) 4.20 - 5.30 M/uL    HGB 11.5 (L) 12.0 - 16.0 g/dL    HCT 34.4 (L) 35.0 - 45.0 %    MCV 86.2 74.0 - 97.0 FL    MCH 28.8 24.0 - 34.0 PG    MCHC 33.4 31.0 - 37.0 g/dL    RDW 13.0 11.6 - 14.5 %    PLATELET 078 630 - 270 K/uL    MPV 9.7 9.2 - 11.8 FL    NEUTROPHILS 43 40 - 73 % LYMPHOCYTES 46 21 - 52 %    MONOCYTES 6 3 - 10 %    EOSINOPHILS 4 0 - 5 %    BASOPHILS 1 0 - 2 %    ABS. NEUTROPHILS 3.9 1.8 - 8.0 K/UL    ABS. LYMPHOCYTES 4.2 (H) 0.9 - 3.6 K/UL    ABS. MONOCYTES 0.5 0.05 - 1.2 K/UL    ABS. EOSINOPHILS 0.4 0.0 - 0.4 K/UL    ABS. BASOPHILS 0.1 (H) 0.0 - 0.06 K/UL    DF AUTOMATED     METABOLIC PANEL, BASIC    Collection Time: 01/22/17 10:50 PM   Result Value Ref Range    Sodium 138 136 - 145 mmol/L    Potassium 3.7 3.5 - 5.5 mmol/L    Chloride 101 100 - 108 mmol/L    CO2 26 21 - 32 mmol/L    Anion gap 11 3.0 - 18 mmol/L    Glucose 94 74 - 99 mg/dL    BUN 8 7.0 - 18 MG/DL    Creatinine 0.71 0.6 - 1.3 MG/DL    BUN/Creatinine ratio 11 (L) 12 - 20      GFR est AA >60 >60 ml/min/1.73m2    GFR est non-AA >60 >60 ml/min/1.73m2    Calcium 9.0 8.5 - 10.1 MG/DL   TYPE, ABO & RH    Collection Time: 01/22/17 10:50 PM   Result Value Ref Range    ABO/Rh(D) O POSITIVE    TOTAL HCG, QT. Collection Time: 01/22/17 10:50 PM   Result Value Ref Range    HCG, Qt. 7196 (H) 1.0 - 6.0 MIU/ML   CBC WITH AUTOMATED DIFF    Collection Time: 01/23/17  2:10 AM   Result Value Ref Range    WBC 6.8 4.6 - 13.2 K/uL    RBC 2.53 (L) 4.20 - 5.30 M/uL    HGB 7.4 (L) 12.0 - 16.0 g/dL    HCT 22.4 (L) 35.0 - 45.0 %    MCV 88.5 74.0 - 97.0 FL    MCH 29.2 24.0 - 34.0 PG    MCHC 33.0 31.0 - 37.0 g/dL    RDW 12.8 11.6 - 14.5 %    PLATELET 457 549 - 208 K/uL    MPV 9.2 9.2 - 11.8 FL    NEUTROPHILS 41 40 - 73 %    LYMPHOCYTES 50 21 - 52 %    MONOCYTES 5 3 - 10 %    EOSINOPHILS 4 0 - 5 %    BASOPHILS 0 0 - 2 %    ABS. NEUTROPHILS 2.8 1.8 - 8.0 K/UL    ABS. LYMPHOCYTES 3.4 0.9 - 3.6 K/UL    ABS. MONOCYTES 0.3 0.05 - 1.2 K/UL    ABS. EOSINOPHILS 0.3 0.0 - 0.4 K/UL    ABS. BASOPHILS 0.0 0.0 - 0.06 K/UL    DF AUTOMATED      PLATELET COMMENTS ADEQUATE PLATELETS      RBC COMMENTS NORMOCYTIC, NORMOCHROMIC       US  Uterus: The uterus measures 9.4 x 5.1 x 6.7 cm.  The endometrium is thickened at  2 cm with mildly heterogeneous material which is at least partially fluid. No  intrauterine gestational sac is identified.     The ovaries are obscured by bowel gas. No adnexal pathology is identified.     Free Fluid: Not present.     IMPRESSION  IMPRESSION:     There is no intrauterine gestation identified. Differential considerations  considering the elevated beta hCG include ectopic gestation and recent  spontaneous        1. Incomplete AB--> Reviewed that this is not her fault, she did not cause this, could not have prevented this, all of her questions were answered. R/B/A of suction D&C reviewed including but not limited to anesthesia, infection, bleeding, bleeding requiring blood transfusion, injury to internal organs, and perforation. All of her questions were answered.

## 2017-01-23 NOTE — ROUTINE PROCESS
TRANSFER - OUT REPORT:    Verbal report given to [facility] RN(name) on Krista Suazo  being transferred to [address] [place] [address] ED(unit) for ordered procedure  (surgical evaluation for possible D&C by Dr Hazel Mcdowell)    Report consisted of patients Situation, Background, Assessment, current findings/treatments/plan of care  Information from the following report(s) ED Summary was reviewed with the receiving nurse. Lines:   Peripheral IV 01/22/17 Left Antecubital (Active)   Site Assessment Clean, dry, & intact 1/22/2017 10:53 PM   Phlebitis Assessment 0 1/22/2017 10:53 PM   Infiltration Assessment 0 1/22/2017 10:53 PM   Dressing Status Clean, dry, & intact 1/22/2017 10:53 PM   Hub Color/Line Status Green 1/22/2017 10:53 PM       Peripheral IV 01/23/17 Right Antecubital (Active)   Site Assessment Clean, dry, & intact 1/23/2017  2:17 AM   Phlebitis Assessment 0 1/23/2017  2:17 AM   Infiltration Assessment 0 1/23/2017  2:17 AM   Dressing Status Clean, dry, & intact 1/23/2017  2:17 AM   Hub Color/Line Status Pink 1/23/2017  2:17 AM        Opportunity for questions and clarification was provided. Patient transported with:   Iv fluids/cardiac monitor

## 2017-01-23 NOTE — ED NOTES
Pt has had 2 additional episodes of moderate bleeding.   Possibly products of conception collected and sent to lab

## 2017-01-23 NOTE — OP NOTES
SUCTION CURETTAGE FULL OP NOTE          PREOPERATIVE DIAGNOSIS:  Incomplete Ab [O02.1]    POSTOPERATIVE DIAGNOSIS:  Incomplete Ab    PROCEDURE: Procedure(s):  SUCTION DILATATION AND CURETTAGE     SURGEON:  Delton Lesch, MD    ANESTHESIA:general    EBL: 75 mL    SPECIMENS: Products of conception    FINDINGS: Normal appearing cervix dilated sufficient to admit a 11 mm curette, POCs, sounded to 11 cm with the curette, moderate uterovaginal prolapse    DESCRIPTION OF PROCEDURE:      After consent was obtained, the patient was placed on the operating table and placed under general anesthesia. She was placed in the dorsal lithotomy position and prepped and draped in the usual fashion. A bivalve vaginal speculum was placed and the cervix was grasped with a single-tooth tenaculum. A curved 11 mm suction curette device was introduced into the endometrial cavity. Thorough suction curettage followed until the suction returned no further clot or products of conception. Gentle sharp curette revealed no more products. Minimal bleeding was noted, instruments were removed. The tenaculum sites were made hemostatic with silver nitrate. The patient went to the recovery room in satisfactory condition. All counts were correct times two. Patient's blood type is Rh +. Will give 2 units of PRBCs, check hgb after second unit, if stable, plan D/C home.       Delton Lesch, MD  1/23/2017  5:34 AM

## 2017-01-23 NOTE — ANESTHESIA PREPROCEDURE EVALUATION
Anesthetic History   No history of anesthetic complications            Review of Systems / Medical History  Patient summary reviewed, nursing notes reviewed and pertinent labs reviewed    Pulmonary  Within defined limits                 Neuro/Psych   Within defined limits           Cardiovascular    Hypertension              Exercise tolerance: >4 METS     GI/Hepatic/Renal  Within defined limits              Endo/Other      Hypothyroidism       Other Findings            Physical Exam    Airway  Mallampati: I  TM Distance: 4 - 6 cm  Neck ROM: normal range of motion   Mouth opening: Normal     Cardiovascular    Rhythm: regular  Rate: normal         Dental  No notable dental hx       Pulmonary  Breath sounds clear to auscultation               Abdominal  GI exam deferred       Other Findings            Anesthetic Plan    ASA: 2, emergent  Anesthesia type: general          Induction: Intravenous  Anesthetic plan and risks discussed with: Patient

## 2017-01-23 NOTE — ED NOTES
Verbal report provided to OR nurse. Pt transported to OR via stretcher. Pt physically stable upon transport.

## 2017-01-23 NOTE — PERIOP NOTES
TRANSFER - OUT REPORT:    Verbal report given to [address] [place] [address] on Nilam Huang  being transferred to [facility] for routine post - op       Report consisted of patients Situation, Background, Assessment and   Recommendations(SBAR). Information from the following report(s) SBAR, OR Summary, Procedure Summary, Intake/Output, MAR and Recent Results was reviewed with the receiving nurse. Lines:   Peripheral IV 01/22/17 Left Antecubital (Active)   Site Assessment Clean, dry, & intact 1/23/2017  6:18 AM   Phlebitis Assessment 0 1/23/2017  6:18 AM   Infiltration Assessment 0 1/23/2017  6:18 AM   Dressing Status Clean, dry, & intact 1/23/2017  6:18 AM   Dressing Type Transparent 1/23/2017  6:18 AM   Hub Color/Line Status Green; Infusing;Patent 1/23/2017  5:41 AM       Peripheral IV 01/23/17 Right Antecubital (Active)   Site Assessment Clean, dry, & intact 1/23/2017  6:18 AM   Phlebitis Assessment 0 1/23/2017  6:18 AM   Infiltration Assessment 0 1/23/2017  6:18 AM   Dressing Status Clean, dry, & intact 1/23/2017  6:18 AM   Dressing Type Transparent 1/23/2017  6:18 AM   Hub Color/Line Status Pink; Infusing;Patent 1/23/2017  6:18 AM        Opportunity for questions and clarification was provided.       Patient transported with:   Registered Nurse

## 2017-01-23 NOTE — ANESTHESIA POSTPROCEDURE EVALUATION
Post-Anesthesia Evaluation & Assessment    Visit Vitals    /63    Pulse 68    Temp 36.7 °C (98 °F)    Resp 15    Ht 5' (1.524 m)    Wt 65.3 kg (144 lb)    SpO2 100%    BMI 28.12 kg/m2       Post-operative hydration adequate. Pain score (VAS): 0 Pain Scale 1: Visual (01/23/17 0541)  Pain Intensity 1: 0 (01/23/17 0541)   Managed. Mental status & Level of consciousness: alert and oriented x 3    Neurological status: moves all extremities, sensation grossly intact    Pulmonary status: airway patent, no supplemental oxygen required    Complications related to anesthesia: none    Patient has met all discharge requirements.     Additional comments:        Wm Hyatt MD  January 23, 2017

## 2017-01-23 NOTE — ED TRIAGE NOTES
Pt c.o abdominal cramping x1-2 weeks, worse today. States she began spotting earlier today. Reports her \"water broke\" upon arrival to ER. Vaginal bleeding noted upon initial assessment. Pt states she is 12 weeks pregnant.  . LMP 10/28  Pt denies seeing OBGYN during this pregnancy.

## 2017-01-23 NOTE — ED NOTES
Upon arrival to the ED patient is alert and oriented x4. Heavy bleeding through the brief and chux pads. Patients undergarments changed and well and blankets and lines. Patient temperature 98.1. Patients states that she has been dizzy during her route with the medics, until now. Patient also states that she is cramping. A bolus of fluid has been started running through her existing IV started at [facility].

## 2017-01-23 NOTE — ED PROVIDER NOTES
HPI Comments: Pt c/o vag bleeding, x one day, was spotting, heavier after arrival, says h/o , one , curr mp 10/28/16, no gyn eval yet. Mild low abd cramping. No back pain. No cp or sob. No weakness. No other bleeding. Patient is a 32 y.o. female presenting with pregnancy problem. Pregnancy Problem    Pertinent negatives include no fever, no vomiting, no chest pain and no back pain. History reviewed. No pertinent past medical history. Past Surgical History:   Procedure Laterality Date    Hx thyroidectomy      Hx thyroidectomy           History reviewed. No pertinent family history. Social History     Social History    Marital status: SINGLE     Spouse name: N/A    Number of children: N/A    Years of education: N/A     Occupational History    Not on file. Social History Main Topics    Smoking status: Never Smoker    Smokeless tobacco: Not on file    Alcohol use No    Drug use: No    Sexual activity: Not on file     Other Topics Concern    Not on file     Social History Narrative         ALLERGIES: Review of patient's allergies indicates no known allergies. Review of Systems   Constitutional: Negative for fever. HENT: Negative for congestion. Respiratory: Negative for cough and shortness of breath. Cardiovascular: Negative for chest pain. Gastrointestinal: Negative for abdominal pain and vomiting. Genitourinary: Positive for vaginal bleeding. Musculoskeletal: Negative for back pain. Skin: Negative for rash. Neurological: Negative for light-headedness. All other systems reviewed and are negative. Vitals:    17 0115 17 0130 17 0201 17 0203   BP: 113/69 108/60 (!) 83/57 97/47   Pulse: 80 76 99 85   Resp: 13 11 22 18   Temp:       SpO2: 99% 99% 99% 100%   Weight:       Height:                Physical Exam   Constitutional: She is oriented to person, place, and time. She appears well-developed.    HENT:   Head: Normocephalic. Mouth/Throat: Oropharynx is clear and moist.   Eyes: Pupils are equal, round, and reactive to light. Neck: Normal range of motion. Cardiovascular: Normal rate and normal heart sounds. No murmur heard. Pulmonary/Chest: Effort normal. She has no wheezes. She has no rales. Abdominal: Soft. There is tenderness (+ cramping). Genitourinary:   Genitourinary Comments: Chaperoned by Theotis Rushing  lg amt of blood in vaginal vault. Musculoskeletal: Normal range of motion. She exhibits no edema. Neurological: She is alert and oriented to person, place, and time. Skin: Skin is warm and dry. Nursing note and vitals reviewed.        [facility]  ED Course       Procedures    Vitals:  Patient Vitals for the past 12 hrs:   Temp Pulse Resp BP SpO2   01/23/17 0203 - 85 18 97/47 100 %   01/23/17 0201 - 99 22 (!) 83/57 99 %   01/23/17 0130 - 76 11 108/60 99 %   01/23/17 0115 - 80 13 113/69 99 %   01/23/17 0100 - 66 16 106/60 100 %   01/23/17 0048 - 71 15 120/67 99 %   01/23/17 0033 - 70 16 107/62 99 %   01/22/17 2315 - - - 112/73 100 %   01/22/17 2252 98 °F (36.7 °C) 69 20 118/67 98 %         Medications ordered:   Medications   0.9% sodium chloride infusion 1,000 mL (0 mL IntraVENous IV Completed 1/23/17 0003)   0.9% sodium chloride infusion 1,000 mL (1,000 mL IntraVENous New Bag 1/23/17 0034)   acetaminophen (TYLENOL) tablet 650 mg (650 mg Oral Given 1/23/17 0050)         Lab findings:  Recent Results (from the past 12 hour(s))   CBC WITH AUTOMATED DIFF    Collection Time: 01/22/17 10:50 PM   Result Value Ref Range    WBC 9.1 4.6 - 13.2 K/uL    RBC 3.99 (L) 4.20 - 5.30 M/uL    HGB 11.5 (L) 12.0 - 16.0 g/dL    HCT 34.4 (L) 35.0 - 45.0 %    MCV 86.2 74.0 - 97.0 FL    MCH 28.8 24.0 - 34.0 PG    MCHC 33.4 31.0 - 37.0 g/dL    RDW 13.0 11.6 - 14.5 %    PLATELET 210 051 - 922 K/uL    MPV 9.7 9.2 - 11.8 FL    NEUTROPHILS 43 40 - 73 %    LYMPHOCYTES 46 21 - 52 %    MONOCYTES 6 3 - 10 %    EOSINOPHILS 4 0 - 5 % BASOPHILS 1 0 - 2 %    ABS. NEUTROPHILS 3.9 1.8 - 8.0 K/UL    ABS. LYMPHOCYTES 4.2 (H) 0.9 - 3.6 K/UL    ABS. MONOCYTES 0.5 0.05 - 1.2 K/UL    ABS. EOSINOPHILS 0.4 0.0 - 0.4 K/UL    ABS. BASOPHILS 0.1 (H) 0.0 - 0.06 K/UL    DF AUTOMATED     METABOLIC PANEL, BASIC    Collection Time: 01/22/17 10:50 PM   Result Value Ref Range    Sodium 138 136 - 145 mmol/L    Potassium 3.7 3.5 - 5.5 mmol/L    Chloride 101 100 - 108 mmol/L    CO2 26 21 - 32 mmol/L    Anion gap 11 3.0 - 18 mmol/L    Glucose 94 74 - 99 mg/dL    BUN 8 7.0 - 18 MG/DL    Creatinine 0.71 0.6 - 1.3 MG/DL    BUN/Creatinine ratio 11 (L) 12 - 20      GFR est AA >60 >60 ml/min/1.73m2    GFR est non-AA >60 >60 ml/min/1.73m2    Calcium 9.0 8.5 - 10.1 MG/DL   TYPE, ABO & RH    Collection Time: 01/22/17 10:50 PM   Result Value Ref Range    ABO/Rh(D) O POSITIVE    TOTAL HCG, QT. Collection Time: 01/22/17 10:50 PM   Result Value Ref Range    HCG, Qt. 7196 (H) 1.0 - 6.0 MIU/ML           X-Ray, CT or other radiology findings or impressions:  US UTS TRANSVAGINAL OB   Final Result          Progress notes, Consult notes or additional Procedure notes:   Cont heavy bleeding, bp drop, sbp < 90, resolved w iv hydration  2:14 AM d/w dr Carissa Kahn, to eval pt, prob d and c.  req tx to   2:15 AM d/w dr Jorge Luis Zavala in  ed, to accept    Disposition:  Diagnosis:   1. Vaginal bleeding during pregnancy, antepartum    2. Incomplete miscarriage        Disposition: home    Follow-up Information     None           Patient's Medications   Start Taking    No medications on file   Continue Taking    HYDROCODONE-ACETAMINOPHEN (NORCO) 5-325 MG PER TABLET    Take 1 tablet by mouth. LEVOTHYROXINE (SYNTHROID) 125 MCG TABLET    Take  by mouth Daily (before breakfast).     LIDOCAINE (LIDOCAINE VISCOUS) 2 % SOLUTION    Put 10 mL in mouth and swish and spit out QAC and at bedtime   These Medications have changed    No medications on file   Stop Taking    No medications on file

## 2017-01-23 NOTE — ED NOTES
Pt rang called bell to have RN change pad. While RN in room pt c/o dizziness. Pts BP decreased. Pt in trendelenburg. ER MD called to bedside. 3rd NACL bag infusing.

## 2017-01-23 NOTE — ED NOTES
Pt has had numerous episodes of bleeding with clots noted. Pt denies dizziness, feeling lightheaded. Pt only c/o mild lower abdominal cramping. Pt is on cardiac monitor and cont SP02. Call bell is within reach. Bed rails up x2. Pts  at bedside.

## 2017-01-24 LAB
C TRACH RRNA SPEC QL NAA+PROBE: NEGATIVE
N GONORRHOEA RRNA SPEC QL NAA+PROBE: NEGATIVE
SPECIMEN SOURCE: NORMAL

## 2017-01-26 LAB
ABO + RH BLD: NORMAL
BLD PROD TYP BPU: NORMAL
BLOOD GROUP ANTIBODIES SERPL: NORMAL
BPU ID: NORMAL
CROSSMATCH RESULT,%XM: NORMAL
SPECIMEN EXP DATE BLD: NORMAL
STATUS OF UNIT,%ST: NORMAL
UNIT DIVISION, %UDIV: 0

## 2017-01-27 ENCOUNTER — TELEPHONE (OUTPATIENT)
Dept: OBGYN CLINIC | Age: 27
End: 2017-01-27

## 2017-01-27 NOTE — TELEPHONE ENCOUNTER
----- Message from Chavo Irvin MD sent at 1/25/2017  7:36 PM EST -----  Please have pt schedule a follow up visit. Thanks!

## 2019-10-28 ENCOUNTER — HOSPITAL ENCOUNTER (EMERGENCY)
Age: 29
Discharge: HOME | End: 2019-10-28
Attending: EMERGENCY MEDICINE | Admitting: EMERGENCY MEDICINE
Payer: SELF-PAY

## 2019-10-28 VITALS
HEART RATE: 64 BPM | SYSTOLIC BLOOD PRESSURE: 104 MMHG | OXYGEN SATURATION: 100 % | DIASTOLIC BLOOD PRESSURE: 70 MMHG | BODY MASS INDEX: 25.91 KG/M2 | WEIGHT: 132 LBS | TEMPERATURE: 98.2 F | RESPIRATION RATE: 18 BRPM | HEIGHT: 60 IN

## 2019-10-28 DIAGNOSIS — R42 LIGHTHEADEDNESS: ICD-10-CM

## 2019-10-28 DIAGNOSIS — Z33.1: Primary | ICD-10-CM

## 2019-10-28 LAB
ANION GAP BLD CALC-SCNC: 15 MMOL/L (ref 10–20)
APPEARANCE UR: CLEAR
BILIRUB UR QL: NEGATIVE
BUN BLD-MCNC: 9 MG/DL (ref 7–18)
CA-I BLD-MCNC: 1.23 MMOL/L (ref 1.12–1.32)
CHLORIDE BLD-SCNC: 103 MMOL/L (ref 100–108)
CO2 BLD-SCNC: 23 MMOL/L (ref 19–24)
COLOR UR: YELLOW
CREAT UR-MCNC: 0.6 MG/DL (ref 0.6–1.3)
GLUCOSE BLD STRIP.AUTO-MCNC: 117 MG/DL (ref 74–106)
GLUCOSE UR STRIP.AUTO-MCNC: NEGATIVE MG/DL
HCT VFR BLD CALC: 30 % (ref 36–49)
HGB BLD-MCNC: 10.2 G/DL (ref 12–16)
HGB UR QL STRIP: NEGATIVE
KETONES UR QL STRIP.AUTO: NEGATIVE MG/DL
LEUKOCYTE ESTERASE UR QL STRIP.AUTO: NEGATIVE
NITRITE UR QL STRIP.AUTO: NEGATIVE
PH UR STRIP: 6.5 [PH] (ref 5–8)
POTASSIUM BLD-SCNC: 3.7 MMOL/L (ref 3.5–5.5)
PROT UR STRIP-MCNC: NEGATIVE MG/DL
SODIUM BLD-SCNC: 136 MMOL/L (ref 136–145)
SP GR UR REFRACTOMETRY: 1.02 (ref 1–1.03)
UROBILINOGEN UR QL STRIP.AUTO: 0.2 EU/DL (ref 0.2–1)

## 2019-10-28 PROCEDURE — 99282 EMERGENCY DEPT VISIT SF MDM: CPT

## 2019-10-28 PROCEDURE — 80047 BASIC METABLC PNL IONIZED CA: CPT

## 2019-10-28 PROCEDURE — 81003 URINALYSIS AUTO W/O SCOPE: CPT

## 2019-10-28 NOTE — ED TRIAGE NOTES
Patient started getting hot, went outside and got lightheaded and passed out, C/O headache and general body aches

## 2019-10-29 NOTE — DISCHARGE INSTRUCTIONS
Patient Education        Lightheadedness or Faintness: Care Instructions  Your Care Instructions  Lightheadedness is a feeling that you are about to faint or \"pass out. \" You do not feel as if you or your surroundings are moving. It is different from vertigo, which is the feeling that you or things around you are spinning or tilting. Lightheadedness usually goes away or gets better when you lie down. If lightheadedness gets worse, it can lead to a fainting spell. It is common to feel lightheaded from time to time. Lightheadedness usually is not caused by a serious problem. It often is caused by a short-lasting drop in blood pressure and blood flow to your head that occurs when you get up too quickly from a seated or lying position. Follow-up care is a key part of your treatment and safety. Be sure to make and go to all appointments, and call your doctor if you are having problems. It's also a good idea to know your test results and keep a list of the medicines you take. How can you care for yourself at home? · Lie down for 1 or 2 minutes when you feel lightheaded. After lying down, sit up slowly and remain sitting for 1 to 2 minutes before slowly standing up. · Avoid movements, positions, or activities that have made you lightheaded in the past.  · Get plenty of rest, especially if you have a cold or flu, which can cause lightheadedness. · Make sure you drink plenty of fluids, especially if you have a fever or have been sweating. · Do not drive or put yourself and others in danger while you feel lightheaded. When should you call for help? Call 911 anytime you think you may need emergency care. For example, call if:    · You have symptoms of a stroke. These may include:  ? Sudden numbness, tingling, weakness, or loss of movement in your face, arm, or leg, especially on only one side of your body. ? Sudden vision changes. ? Sudden trouble speaking.   ? Sudden confusion or trouble understanding simple statements. ? Sudden problems with walking or balance. ? A sudden, severe headache that is different from past headaches.     · You have symptoms of a heart attack. These may include:  ? Chest pain or pressure, or a strange feeling in the chest.  ? Sweating. ? Shortness of breath. ? Nausea or vomiting. ? Pain, pressure, or a strange feeling in the back, neck, jaw, or upper belly or in one or both shoulders or arms. ? Lightheadedness or sudden weakness. ? A fast or irregular heartbeat. After you call 911, the  may tell you to chew 1 adult-strength or 2 to 4 low-dose aspirin. Wait for an ambulance. Do not try to drive yourself.    Watch closely for changes in your health, and be sure to contact your doctor if:    · Your lightheadedness gets worse or does not get better with home care. Where can you learn more? Go to http://jeancarlos-meera.info/. Enter D515 in the search box to learn more about \"Lightheadedness or Faintness: Care Instructions. \"  Current as of: June 26, 2019  Content Version: 12.2  © 4213-4731 [employer]. Care instructions adapted under license by [employer] (which disclaims liability or warranty for this information). If you have questions about a medical condition or this instruction, always ask your healthcare professional. [address] any warranty or liability for your use of this information.

## 2019-10-29 NOTE — ED PROVIDER NOTES
EMERGENCY DEPARTMENT HISTORY AND PHYSICAL EXAM      Date: 10/28/2019  Patient Name: Stanton Eubanks    History of Presenting Illness     Chief Complaint   Patient presents with    Syncope    Headache    Generalized Body Aches       History Provided By: Patient    Chief Complaint: lightheaded    Additional History (Context): Stanton Eubanks is a 34 y.o. female whi is ~15 weeks pregnant who presents with lightheadedness. States that she stayed awake most of the night with friends, was tired and fatigued, felt dysphoric and went outside to get some fresh air, then felt lightheaded like she was going to pass out. Now feeling back to normal. No palpitations, headache, F/C/S, abd pain, N/V/D, rash, urinary symptoms, seizure activity. PCP: None    Current Outpatient Medications   Medication Sig Dispense Refill    oxyCODONE-acetaminophen (PERCOCET) 5-325 mg per tablet Take 1-2 Tabs by mouth every six (6) hours as needed for Pain. Max Daily Amount: 8 Tabs. 20 Tab 0    iron-vitamin C (VITRON-C) 65 mg iron- 125 mg TbEC Take 1 Tab by mouth daily. 90 Tab 1    levothyroxine (SYNTHROID) 125 mcg tablet Take  by mouth Daily (before breakfast).  lidocaine (LIDOCAINE VISCOUS) 2 % solution Put 10 mL in mouth and swish and spit out QAC and at bedtime 200 mL 0       Past History     Past Medical History:  Past Medical History:   Diagnosis Date    Hypothyroid        Past Surgical History:  Past Surgical History:   Procedure Laterality Date    HX THYROIDECTOMY         Family History:  Family History   Problem Relation Age of Onset    Thyroid Disease Maternal Grandmother        Social History:  Social History     Tobacco Use    Smoking status: Never Smoker   Substance Use Topics    Alcohol use: No    Drug use: No       Allergies:  No Known Allergies      Review of Systems   Review of Systems   Constitutional: Positive for fatigue. Negative for chills and fever.    HENT: Negative for congestion, rhinorrhea, sore throat and trouble swallowing. Eyes: Negative for discharge, redness and itching. Respiratory: Negative for cough, shortness of breath, wheezing and stridor. Cardiovascular: Negative for chest pain, palpitations and leg swelling. Gastrointestinal: Negative for abdominal pain, blood in stool, diarrhea, nausea and vomiting. Genitourinary: Negative for decreased urine volume, difficulty urinating, dysuria, pelvic pain, vaginal bleeding and vaginal discharge. Musculoskeletal: Negative for back pain. Skin: Negative for rash. Neurological: Positive for dizziness, weakness and light-headedness. Negative for tremors, seizures, syncope, facial asymmetry, speech difficulty, numbness and headaches. Psychiatric/Behavioral: Negative for behavioral problems and confusion. All other systems reviewed and are negative. Physical Exam     Vitals:    10/28/19 1834   BP: 104/70   Pulse: 64   Resp: 18   Temp: 98.2 °F (36.8 °C)   SpO2: 100%   Weight: 59.9 kg (132 lb)   Height: 5' (1.524 m)     Physical Exam   Constitutional: She is oriented to person, place, and time. She appears well-developed and well-nourished. No distress. HENT:   Head: Normocephalic and atraumatic. Eyes: Pupils are equal, round, and reactive to light. Conjunctivae and EOM are normal.   Neck: Normal range of motion. Neck supple. Cardiovascular: Normal rate, regular rhythm and normal heart sounds. Pulmonary/Chest: Effort normal and breath sounds normal. She has no wheezes. She has no rales. Abdominal: Soft. Bowel sounds are normal. She exhibits no distension. There is no tenderness. Musculoskeletal: Normal range of motion. She exhibits no edema. Lymphadenopathy:     She has no cervical adenopathy. Neurological: She is alert and oriented to person, place, and time. Skin: Skin is warm and dry. No rash noted. She is not diaphoretic. Nursing note and vitals reviewed.         Diagnostic Study Results     Labs -     Recent Results (from the past 12 hour(s))   URINALYSIS W/ RFLX MICROSCOPIC    Collection Time: 10/28/19  6:37 PM   Result Value Ref Range    Color YELLOW      Appearance CLEAR      Specific gravity 1.022 1.005 - 1.030      pH (UA) 6.5 5.0 - 8.0      Protein NEGATIVE  NEG mg/dL    Glucose NEGATIVE  NEG mg/dL    Ketone NEGATIVE  NEG mg/dL    Bilirubin NEGATIVE  NEG      Blood NEGATIVE  NEG      Urobilinogen 0.2 0.2 - 1.0 EU/dL    Nitrites NEGATIVE  NEG      Leukocyte Esterase NEGATIVE  NEG     POC CHEM8    Collection Time: 10/28/19  7:00 PM   Result Value Ref Range    CO2, POC 23 19 - 24 MMOL/L    Glucose,  (H) 74 - 106 MG/DL    BUN, POC 9 7 - 18 MG/DL    Creatinine, POC 0.6 0.6 - 1.3 MG/DL    GFRAA, POC >60 >60 ml/min/1.73m2    GFRNA, POC >60 >60 ml/min/1.73m2    Sodium,  136 - 145 MMOL/L    Potassium, POC 3.7 3.5 - 5.5 MMOL/L    Calcium, ionized (POC) 1.23 1.12 - 1.32 mmol/L    Chloride,  100 - 108 MMOL/L    Anion gap, POC 15 10 - 20      Hematocrit, POC 30 (L) 36 - 49 %    Hemoglobin, POC 10.2 (L) 12 - 16 G/DL       Radiologic Studies -   No orders to display     CT Results  (Last 48 hours)    None        CXR Results  (Last 48 hours)    None            Medical Decision Making   I am the first provider for this patient. I reviewed the vital signs, available nursing notes, past medical history, past surgical history, family history and social history. Vital Signs-Reviewed the patient's vital signs. Records Reviewed: Old Medical Records    ED Course:   Remained stable during her ED stay. Disposition:  Discharge home    DISCHARGE NOTE:     Pt has been reexamined. Patient has no new complaints, changes, or physical findings. Care plan outlined and precautions discussed. Results of labs were reviewed with the patient. All medications were reviewed with the patient; will d/c home with symptomatic care. All of pt's questions and concerns were addressed.  Patient was instructed and agrees to follow up with her OBGYN, as well as to return to the ED upon further deterioration. Patient is ready to go home. Follow-up Information     Follow up With Specialties Details Why Contact Info    your OBGYN provider  Call in 1 day      [facility] EMERGENCY DEPT Emergency Medicine  As needed, If symptoms worsen [address]  [phone]          Current Discharge Medication List          Provider Notes (Medical Decision Making): Intrauterine pregnancy with lightheadedness, ?etiology. Seems to be related to poor oral intake and staying awake all night. Reassuring benign exam, VS, labs with no evidence of infection or seizure or pregnancy-related complication or other acute life-threat. Symptomatic care, PCM f/u. Diagnosis     Clinical Impression:   1. Intrauterine pregnancy, incidental    2.  Lightheadedness

## (undated) DEVICE — DRAPE,REIN 53X77,STERILE: Brand: MEDLINE

## (undated) DEVICE — LEGGINGS, PAIR, 31X48, STERILE: Brand: MEDLINE

## (undated) DEVICE — TABLE COVER: Brand: CONVERTORS

## (undated) DEVICE — DRAPE SURG L39XW46IN PERI OB

## (undated) DEVICE — MATERNITY PAD,HEAVY: Brand: CURITY

## (undated) DEVICE — STERILE POLYISOPRENE POWDER-FREE SURGICAL GLOVES: Brand: PROTEXIS

## (undated) DEVICE — CURETTE UTER VAC CRV 11MM LF -- GYRUS

## (undated) DEVICE — GOWN,REINFORCED,POLY,AURORA,XLARGE,STRL: Brand: MEDLINE

## (undated) DEVICE — KIT CLN UP BON SECOURS MARYV

## (undated) DEVICE — TELFA NON-ADHERENT ABSORBENT DRESSING: Brand: TELFA

## (undated) DEVICE — FLEX ADVANTAGE 3000CC: Brand: FLEX ADVANTAGE

## (undated) DEVICE — TRAY PREP DRY W/ PREM GLV 2 APPL 6 SPNG 2 UNDPD 1 OVERWRAP

## (undated) DEVICE — X-RAY SPONGES,12 PLY: Brand: DERMACEA

## (undated) DEVICE — 3M™ BAIR PAWS FLEX™ WARMING GOWN, STANDARD, 20 PER CASE 81003: Brand: BAIR PAWS™